# Patient Record
Sex: MALE | Race: BLACK OR AFRICAN AMERICAN | NOT HISPANIC OR LATINO | URBAN - NONMETROPOLITAN AREA
[De-identification: names, ages, dates, MRNs, and addresses within clinical notes are randomized per-mention and may not be internally consistent; named-entity substitution may affect disease eponyms.]

---

## 2021-08-09 ENCOUNTER — IMPORTED ENCOUNTER (OUTPATIENT)
Dept: URBAN - NONMETROPOLITAN AREA CLINIC 1 | Facility: CLINIC | Age: 46
End: 2021-08-09

## 2021-08-09 PROBLEM — H52.13: Noted: 2021-08-09

## 2021-08-09 PROBLEM — H52.223: Noted: 2021-08-09

## 2021-08-09 PROCEDURE — 92310 CONTACT LENS FITTING OU: CPT

## 2021-08-09 PROCEDURE — 92004 COMPRE OPH EXAM NEW PT 1/>: CPT

## 2021-08-09 PROCEDURE — 92015 DETERMINE REFRACTIVE STATE: CPT

## 2022-04-09 ASSESSMENT — VISUAL ACUITY
OD_SC: 20/30+
OS_SC: 20/30+

## 2022-04-09 ASSESSMENT — TONOMETRY
OS_IOP_MMHG: 16
OD_IOP_MMHG: 16

## 2022-06-08 ENCOUNTER — EMERGENCY VISIT (OUTPATIENT)
Dept: URBAN - NONMETROPOLITAN AREA CLINIC 1 | Facility: CLINIC | Age: 47
End: 2022-06-08

## 2022-06-08 DIAGNOSIS — H10.13: ICD-10-CM

## 2022-06-08 PROCEDURE — 99213 OFFICE O/P EST LOW 20 MIN: CPT

## 2022-06-08 ASSESSMENT — VISUAL ACUITY
OD_CC: 20/30
OS_PH: 20/40
OS_CC: 20/50

## 2022-06-08 ASSESSMENT — TONOMETRY
OD_IOP_MMHG: 15
OS_IOP_MMHG: 15

## 2022-06-08 NOTE — PATIENT DISCUSSION
D/C Cipro and start Eysuvis TID OU for a couple of day and then taper down samples given today once done with those start pataday BID OU samples given today.

## 2022-08-11 ENCOUNTER — ESTABLISHED PATIENT (OUTPATIENT)
Dept: URBAN - NONMETROPOLITAN AREA CLINIC 1 | Facility: CLINIC | Age: 47
End: 2022-08-11

## 2022-08-11 DIAGNOSIS — H52.223: ICD-10-CM

## 2022-08-11 DIAGNOSIS — H52.4: ICD-10-CM

## 2022-08-11 DIAGNOSIS — H52.13: ICD-10-CM

## 2022-08-11 PROCEDURE — 92014 COMPRE OPH EXAM EST PT 1/>: CPT

## 2022-08-11 PROCEDURE — 92015 DETERMINE REFRACTIVE STATE: CPT

## 2022-08-11 ASSESSMENT — VISUAL ACUITY
OD_CC: 20/29
OS_CC: 20/22

## 2022-08-11 ASSESSMENT — TONOMETRY
OS_IOP_MMHG: 13
OD_IOP_MMHG: 13

## 2024-04-08 ENCOUNTER — ESTABLISHED PATIENT (OUTPATIENT)
Dept: URBAN - NONMETROPOLITAN AREA CLINIC 1 | Facility: CLINIC | Age: 49
End: 2024-04-08

## 2024-04-08 DIAGNOSIS — H52.13: ICD-10-CM

## 2024-04-08 DIAGNOSIS — H52.223: ICD-10-CM

## 2024-04-08 DIAGNOSIS — H52.4: ICD-10-CM

## 2024-04-08 PROCEDURE — 92014 COMPRE OPH EXAM EST PT 1/>: CPT

## 2024-04-08 PROCEDURE — 92015 DETERMINE REFRACTIVE STATE: CPT

## 2024-04-08 ASSESSMENT — VISUAL ACUITY
OU_CC: 20/22+
OS_CC: 20/22+
OD_CC: 20/29+

## 2024-04-08 ASSESSMENT — TONOMETRY
OD_IOP_MMHG: 14
OS_IOP_MMHG: 14

## 2024-06-03 ENCOUNTER — CONTACT LENSES/GLASSES VISIT (OUTPATIENT)
Dept: URBAN - NONMETROPOLITAN AREA CLINIC 1 | Facility: CLINIC | Age: 49
End: 2024-06-03

## 2024-06-03 DIAGNOSIS — H52.13: ICD-10-CM

## 2024-06-03 DIAGNOSIS — H52.223: ICD-10-CM

## 2024-06-03 DIAGNOSIS — H52.4: ICD-10-CM

## 2024-06-03 PROCEDURE — 92310-14 CONTACT LENS FITTING - 150: Mod: 21

## 2024-06-03 ASSESSMENT — VISUAL ACUITY
OS_CC: 20/30
OD_CC: 20/25-2

## 2024-06-03 ASSESSMENT — KERATOMETRY
OS_K2POWER_DIOPTERS: 43.50
OD_AXISANGLE_DEGREES: 163
OD_K1POWER_DIOPTERS: 42.50
OD_K2POWER_DIOPTERS: 44.00
OS_K1POWER_DIOPTERS: 42.50
OS_AXISANGLE2_DEGREES: 93
OS_AXISANGLE_DEGREES: 3
OD_AXISANGLE2_DEGREES: 73